# Patient Record
Sex: MALE | URBAN - METROPOLITAN AREA
[De-identification: names, ages, dates, MRNs, and addresses within clinical notes are randomized per-mention and may not be internally consistent; named-entity substitution may affect disease eponyms.]

---

## 2017-03-31 ENCOUNTER — INPATIENT (INPATIENT)
Facility: HOSPITAL | Age: 37
LOS: 0 days | Discharge: HOME | DRG: 395 | End: 2017-03-31
Attending: HOSPITALIST | Admitting: HOSPITALIST
Payer: COMMERCIAL

## 2017-03-31 VITALS
TEMPERATURE: 98 F | SYSTOLIC BLOOD PRESSURE: 134 MMHG | HEART RATE: 78 BPM | RESPIRATION RATE: 18 BRPM | DIASTOLIC BLOOD PRESSURE: 85 MMHG | OXYGEN SATURATION: 97 %

## 2017-03-31 VITALS
SYSTOLIC BLOOD PRESSURE: 144 MMHG | DIASTOLIC BLOOD PRESSURE: 78 MMHG | HEART RATE: 76 BPM | RESPIRATION RATE: 20 BRPM | TEMPERATURE: 98 F | OXYGEN SATURATION: 98 %

## 2017-03-31 DIAGNOSIS — T18.128A FOOD IN ESOPHAGUS CAUSING OTHER INJURY, INITIAL ENCOUNTER: ICD-10-CM

## 2017-03-31 DIAGNOSIS — Z98.890 OTHER SPECIFIED POSTPROCEDURAL STATES: Chronic | ICD-10-CM

## 2017-03-31 DIAGNOSIS — Z29.9 ENCOUNTER FOR PROPHYLACTIC MEASURES, UNSPECIFIED: ICD-10-CM

## 2017-03-31 DIAGNOSIS — T18.108A UNSPECIFIED FOREIGN BODY IN ESOPHAGUS CAUSING OTHER INJURY, INITIAL ENCOUNTER: ICD-10-CM

## 2017-03-31 LAB
ALBUMIN SERPL ELPH-MCNC: 4.8 G/DL — SIGNIFICANT CHANGE UP (ref 3.3–5)
ALP SERPL-CCNC: 77 U/L — SIGNIFICANT CHANGE UP (ref 40–120)
ALT FLD-CCNC: 27 U/L RC — SIGNIFICANT CHANGE UP (ref 10–45)
ANION GAP SERPL CALC-SCNC: 14 MMOL/L — SIGNIFICANT CHANGE UP (ref 5–17)
APTT BLD: 36.2 SEC — SIGNIFICANT CHANGE UP (ref 27.5–37.4)
AST SERPL-CCNC: 23 U/L — SIGNIFICANT CHANGE UP (ref 10–40)
BILIRUB SERPL-MCNC: 1.4 MG/DL — HIGH (ref 0.2–1.2)
BUN SERPL-MCNC: 22 MG/DL — SIGNIFICANT CHANGE UP (ref 7–23)
CALCIUM SERPL-MCNC: 9.5 MG/DL — SIGNIFICANT CHANGE UP (ref 8.4–10.5)
CHLORIDE SERPL-SCNC: 103 MMOL/L — SIGNIFICANT CHANGE UP (ref 96–108)
CO2 SERPL-SCNC: 25 MMOL/L — SIGNIFICANT CHANGE UP (ref 22–31)
CREAT SERPL-MCNC: 1 MG/DL — SIGNIFICANT CHANGE UP (ref 0.5–1.3)
GLUCOSE SERPL-MCNC: 96 MG/DL — SIGNIFICANT CHANGE UP (ref 70–99)
HCT VFR BLD CALC: 49.6 % — SIGNIFICANT CHANGE UP (ref 39–50)
HGB BLD-MCNC: 16.7 G/DL — SIGNIFICANT CHANGE UP (ref 13–17)
INR BLD: 1.07 RATIO — SIGNIFICANT CHANGE UP (ref 0.88–1.16)
MCHC RBC-ENTMCNC: 28.5 PG — SIGNIFICANT CHANGE UP (ref 27–34)
MCHC RBC-ENTMCNC: 33.7 GM/DL — SIGNIFICANT CHANGE UP (ref 32–36)
MCV RBC AUTO: 84.7 FL — SIGNIFICANT CHANGE UP (ref 80–100)
PLATELET # BLD AUTO: 223 K/UL — SIGNIFICANT CHANGE UP (ref 150–400)
POTASSIUM SERPL-MCNC: 4.4 MMOL/L — SIGNIFICANT CHANGE UP (ref 3.5–5.3)
POTASSIUM SERPL-SCNC: 4.4 MMOL/L — SIGNIFICANT CHANGE UP (ref 3.5–5.3)
PROT SERPL-MCNC: 7.6 G/DL — SIGNIFICANT CHANGE UP (ref 6–8.3)
PROTHROM AB SERPL-ACNC: 11.6 SEC — SIGNIFICANT CHANGE UP (ref 9.8–12.7)
RBC # BLD: 5.86 M/UL — HIGH (ref 4.2–5.8)
RBC # FLD: 12.7 % — SIGNIFICANT CHANGE UP (ref 10.3–14.5)
SODIUM SERPL-SCNC: 142 MMOL/L — SIGNIFICANT CHANGE UP (ref 135–145)
WBC # BLD: 10.1 K/UL — SIGNIFICANT CHANGE UP (ref 3.8–10.5)
WBC # FLD AUTO: 10.1 K/UL — SIGNIFICANT CHANGE UP (ref 3.8–10.5)

## 2017-03-31 PROCEDURE — 99254 IP/OBS CNSLTJ NEW/EST MOD 60: CPT | Mod: 25,GC

## 2017-03-31 PROCEDURE — 99285 EMERGENCY DEPT VISIT HI MDM: CPT | Mod: 25

## 2017-03-31 PROCEDURE — 43247 EGD REMOVE FOREIGN BODY: CPT | Mod: GC

## 2017-03-31 PROCEDURE — 80053 COMPREHEN METABOLIC PANEL: CPT

## 2017-03-31 PROCEDURE — 71020: CPT | Mod: 26

## 2017-03-31 PROCEDURE — 99222 1ST HOSP IP/OBS MODERATE 55: CPT

## 2017-03-31 PROCEDURE — 85027 COMPLETE CBC AUTOMATED: CPT

## 2017-03-31 PROCEDURE — 71046 X-RAY EXAM CHEST 2 VIEWS: CPT

## 2017-03-31 PROCEDURE — 99285 EMERGENCY DEPT VISIT HI MDM: CPT

## 2017-03-31 PROCEDURE — 85730 THROMBOPLASTIN TIME PARTIAL: CPT

## 2017-03-31 PROCEDURE — 85610 PROTHROMBIN TIME: CPT

## 2017-03-31 RX ORDER — ONDANSETRON 8 MG/1
4 TABLET, FILM COATED ORAL EVERY 6 HOURS
Qty: 0 | Refills: 0 | Status: DISCONTINUED | OUTPATIENT
Start: 2017-03-31 | End: 2017-03-31

## 2017-03-31 RX ORDER — PANTOPRAZOLE SODIUM 20 MG/1
40 TABLET, DELAYED RELEASE ORAL
Qty: 0 | Refills: 0 | Status: DISCONTINUED | OUTPATIENT
Start: 2017-03-31 | End: 2017-03-31

## 2017-03-31 RX ORDER — PANTOPRAZOLE SODIUM 20 MG/1
40 TABLET, DELAYED RELEASE ORAL ONCE
Qty: 0 | Refills: 0 | Status: DISCONTINUED | OUTPATIENT
Start: 2017-03-31 | End: 2017-03-31

## 2017-03-31 RX ORDER — SODIUM CHLORIDE 9 MG/ML
1000 INJECTION, SOLUTION INTRAVENOUS
Qty: 0 | Refills: 0 | Status: DISCONTINUED | OUTPATIENT
Start: 2017-03-31 | End: 2017-03-31

## 2017-03-31 RX ORDER — PANTOPRAZOLE SODIUM 20 MG/1
1 TABLET, DELAYED RELEASE ORAL
Qty: 30 | Refills: 0 | OUTPATIENT
Start: 2017-03-31 | End: 2017-04-30

## 2017-03-31 RX ADMIN — SODIUM CHLORIDE 70 MILLILITER(S): 9 INJECTION, SOLUTION INTRAVENOUS at 15:16

## 2017-03-31 NOTE — ED PROVIDER NOTE - NOTES
GI saw and dora in ED admit for EGD, case d/w Dr. Ludwig (PMD) admit to hospitalist. Dr. Story aware

## 2017-03-31 NOTE — ED PROVIDER NOTE - MEDICAL DECISION MAKING DETAILS
38 yo male presenting with foreign body sensation in chest; will consult GI xray ---> re eval likely endoscopy 36 yo male presenting with foreign body sensation in chest; will consult GI xray ---> re eval likely endoscopy    Attending MD Sauceda: 36yo male with PMH for esophageal food impaction 2015, presents with piece of corned beef stuck in esophagus since 6p last night.  Seen by GI in ED in ED and will take to endoscopy today.  Voice hoarse otherwise normal physical exam.

## 2017-03-31 NOTE — DISCHARGE NOTE ADULT - CARE PLAN
Principal Discharge DX:	Food impaction of esophagus  Goal:	Normal diet  Instructions for follow-up, activity and diet:	Follow up with GI Clinic for pathology results. Protonix 40 mg daily.  Secondary Diagnosis:	Eosinophilic esophagitis  Instructions for follow-up, activity and diet:	Possible eosinophilic esophagitis. Follow up with Dr. Diogenes Vega in GI clinic for pathology results and use Protonix daily

## 2017-03-31 NOTE — ED PROVIDER NOTE - OBJECTIVE STATEMENT
36 yo male presenting with foreign body sensation in his mid chest.  patient was eating corn beef at 6 pm last night and has had this feeling since then. has tried multiple attempts at vomiting but only a few pieces have came up.  has not taken anything for his symptoms.  foreign body feeling is not improved or worsened with anything.  happened last year and patient was admitted and intubated to dislodge the piece.  denies fevers, chills, sob, diarrhea.

## 2017-03-31 NOTE — H&P ADULT. - HISTORY OF PRESENT ILLNESS
36 y/o male with PMH of food impaction in 2015 presented with foreign body sensation in his mid chest and multiple episodes of NBNB vomiting after having corn beef at 6 pm last night and gets worse with eating or drinking. He vomited multiple times but only a few pieces have came up. He reports change in his voice after vomiting but denies any palpitation, SOB, LOC, rash, diarrhea, fever and chills.

## 2017-03-31 NOTE — DISCHARGE NOTE ADULT - CARE PROVIDER_API CALL
Harmony Ludwig), Medicine  [address] [place] [address]  Phone: [phone]  Fax: [phone]    Diogenes Vega), Gastroenterology; Internal Medicine  [address] [place] [address]  Phone: [phone]  Fax: [phone]
Harmony Ludwig), Medicine  [address] [place] [address]  Phone: [phone]  Fax: [phone]    Diogenes Vega), Gastroenterology; Internal Medicine  [address] [place] [address]  Phone: [phone]  Fax: [phone]

## 2017-03-31 NOTE — DISCHARGE NOTE ADULT - MEDICATION SUMMARY - MEDICATIONS TO CHANGE
I will SWITCH the dose or number of times a day I take the medications listed below when I get home from the hospital:  None
I will SWITCH the dose or number of times a day I take the medications listed below when I get home from the hospital:  None

## 2017-03-31 NOTE — DISCHARGE NOTE ADULT - CARE PLAN
Principal Discharge DX:	Food impaction of esophagus  Goal:	Normal food digestion  Instructions for follow-up, activity and diet:	Follow up with GI clinic and use Protonix 40 mg daily  Secondary Diagnosis:	Eosinophilic esophagitis  Instructions for follow-up, activity and diet:	Follow up with GI clinic and use Protonix 40 mg daily

## 2017-03-31 NOTE — DISCHARGE NOTE ADULT - MEDICATION SUMMARY - MEDICATIONS TO STOP TAKING
I will STOP taking the medications listed below when I get home from the hospital:  None
I will STOP taking the medications listed below when I get home from the hospital:  None

## 2017-03-31 NOTE — H&P ADULT. - ASSESSMENT
36 y/o male with PMH of food impaction in 2015 presented with foreign body sensation in his mid chest and multiple episodes of NBNB vomiting after having corn beef at 6 pm last night and gets worse with eating or drinking.

## 2017-03-31 NOTE — DISCHARGE NOTE ADULT - CARE PROVIDERS DIRECT ADDRESSES
,DirectAddress_Unknown,[email or web address],DirectAddress_Unknown
,DirectAddress_Unknown,[email or web address],DirectAddress_Unknown

## 2017-03-31 NOTE — ED PROVIDER NOTE - ATTENDING CONTRIBUTION TO CARE
Attending MD Sauceda:  I personally have seen and examined this patient.  Resident note reviewed and agree on plan of care and except where noted.  See MDM for details.

## 2017-03-31 NOTE — DISCHARGE NOTE ADULT - HOSPITAL COURSE
36 y/o male with PMH of food impaction in 2015 presented with foreign body sensation in his mid chest and multiple episodes of NBNB vomiting after having corn beef at 6 pm last night which got worse with eating or drinking. He had EGD done which showed food in the lower third of the esophagus. Removal of food was accomplished. Localized erythema was found in the lower third of the esophagus. Biopsies were taken the distal and mid-esophagus to evaluate for eosinophilic esophagitis. One 5 mm sessile polyp was found on the lesser curvature of the stomach. The polyp was removed with a jumbo cold forceps. Resection and retrieval were complete. Diet has been advanced and as per GI recommendation, patient will be discharged home with follow up with Dr. Diogenes Vega in GI clinic for pathology results and use Protonix daily.

## 2017-03-31 NOTE — H&P ADULT. - RS GEN PE MLT RESP DETAILS PC
clear to auscultation bilaterally/no rhonchi/no wheezes/airway patent/no subcutaneous emphysema/no rales/breath sounds equal

## 2017-03-31 NOTE — DISCHARGE NOTE ADULT - MEDICATION SUMMARY - MEDICATIONS TO TAKE
I will START or STAY ON the medications listed below when I get home from the hospital:    Protonix 40 mg oral granule, enteric coated  -- 1 tab(s) by mouth once a day before breakfast  -- It is very important that you take or use this exactly as directed.  Do not skip doses or discontinue unless directed by your doctor.  Obtain medical advice before taking any non-prescription drugs as some may affect the action of this medication.    -- Indication: For Eosinophilic esophagitis
I will START or STAY ON the medications listed below when I get home from the hospital:    Protonix 40 mg oral granule, enteric coated  -- 1 tab(s) by mouth once a day before breakfast  -- It is very important that you take or use this exactly as directed.  Do not skip doses or discontinue unless directed by your doctor.  Obtain medical advice before taking any non-prescription drugs as some may affect the action of this medication.    -- Indication: For Eosinophilic esophagitis

## 2017-03-31 NOTE — DISCHARGE NOTE ADULT - NURSING SECTION COMPLETE
Patient/Caregiver provided printed discharge information.
Patient/Caregiver provided printed discharge information.

## 2017-03-31 NOTE — DISCHARGE NOTE ADULT - PLAN OF CARE
Normal diet Follow up with GI Clinic for pathology results. Protonix 40 mg daily. Possible eosinophilic esophagitis. Follow up with Dr. Diogenes Vega in GI clinic for pathology results and use Protonix daily

## 2017-03-31 NOTE — H&P ADULT. - NEUROLOGICAL DETAILS
sensation intact/cranial nerves intact/normal strength/no spontaneous movement/alert and oriented x 3/responds to verbal commands/superficial reflexes intact

## 2017-03-31 NOTE — ED ADULT NURSE NOTE - OBJECTIVE STATEMENT
37y male presents to ED (ambulatory) complaining of "corn-beef stuck in throat" patient states this occurred last night 3/30/17. Patient can breath and speak, voice is hoarse, O2 98% room air. Patient has been vomiting since last night and states that he continues to vomit every time he drinks water or swallows something. He is producing more saliva than usual. He had a similar event last year and stated that he was previously intubated for this.

## 2017-03-31 NOTE — DISCHARGE NOTE ADULT - PATIENT PORTAL LINK FT
“You can access the FollowHealth Patient Portal, offered by [facility], by registering with the following website: [email or web address]”

## 2017-03-31 NOTE — H&P ADULT. - PROBLEM SELECTOR PLAN 1
- NPO  - GI consulted   - Endoscopy today   - Zofran IV PRN   - IV fluid   - EKG - NPO  - GI consulted   - Endoscopy today   - Zofran IV PRN   - IV fluid   - EKG  - Called PCP waiting for call back - NPO  - D/W GI  - Endoscopy today   - Zofran IV PRN   - IV fluid   - EKG  - Called PCP waiting for call back

## 2017-03-31 NOTE — DISCHARGE NOTE ADULT - HOSPITAL COURSE
36 y/o male with PMH of food impaction in 2015 presented with foreign body sensation in his mid chest and multiple episodes of NBNB vomiting after having corn beef at 6 pm last night and gets worse with eating or drinking. He had EGD done which showed food in the lower third of the esophagus. Removal of food was accomplished. Localized erythema was found in the lower third of the esophagus. Biopsies were taken the distal and mid-esophagus to evaluate for eosinophilic esophagitis. One 5 mm sessile polyp was found on the lesser curvature of the stomach. The polyp was removed with a jumbo cold forceps. Resection and retrieval were complete. Follow up with Dr. Diogenes Vega in GI clinic for pathology results and use Protonix daily. 38 y/o male with PMH of food impaction in 2015 presented with foreign body sensation in his mid chest and multiple episodes of NBNB vomiting after having corn beef at 6 pm last night which got worse with eating or drinking. He had EGD done which showed food in the lower third of the esophagus. Removal of food was accomplished. Localized erythema was found in the lower third of the esophagus. Biopsies were taken the distal and mid-esophagus to evaluate for eosinophilic esophagitis. One 5 mm sessile polyp was found on the lesser curvature of the stomach. The polyp was removed with a jumbo cold forceps. Resection and retrieval were complete. Diet has been advanced and as per GI recommendation, patient will be discharged home with follow up with Dr. Diogenes Vega in GI clinic for pathology results and use Protonix daily.

## 2017-03-31 NOTE — ED PROVIDER NOTE - CONSTITUTIONAL, MLM
normal... uncomfortable, well nourished, awake, alert, oriented to person, place, time/situation and in moderate distress.

## 2017-03-31 NOTE — DISCHARGE NOTE ADULT - PATIENT PORTAL LINK FT
“You can access the Follow[facility] Patient Portal, offered by [facility], by registering with the following website: [email or web address]”

## 2017-04-04 LAB — SURGICAL PATHOLOGY STUDY: SIGNIFICANT CHANGE UP

## 2017-04-20 PROBLEM — Z00.00 ENCOUNTER FOR PREVENTIVE HEALTH EXAMINATION: Noted: 2017-04-20

## 2017-05-25 ENCOUNTER — APPOINTMENT (OUTPATIENT)
Dept: GASTROENTEROLOGY | Facility: CLINIC | Age: 37
End: 2017-05-25

## 2017-05-25 VITALS
BODY MASS INDEX: 30.34 KG/M2 | DIASTOLIC BLOOD PRESSURE: 80 MMHG | HEIGHT: 72 IN | OXYGEN SATURATION: 98 % | SYSTOLIC BLOOD PRESSURE: 120 MMHG | WEIGHT: 224 LBS | TEMPERATURE: 98.2 F | HEART RATE: 87 BPM

## 2017-05-25 DIAGNOSIS — K21.9 GASTRO-ESOPHAGEAL REFLUX DISEASE W/OUT ESOPHAGITIS: ICD-10-CM

## 2017-05-25 DIAGNOSIS — T18.128A FOOD IN ESOPHAGUS CAUSING OTHER INJURY, INITIAL ENCOUNTER: ICD-10-CM

## 2017-05-25 RX ORDER — OMEPRAZOLE 20 MG/1
20 CAPSULE, DELAYED RELEASE ORAL
Qty: 90 | Refills: 3 | Status: ACTIVE | COMMUNITY
Start: 2017-05-25 | End: 1900-01-01

## 2022-02-14 NOTE — H&P ADULT. - SKIN/BREAST
Pt was calling to see if its okay to hold Eliquis for shoulder surgery. Surgery is not scheduled yet, waiting to see if its okay to hold Eliquis for 2 days before surgery with Dr. Knutson. . Please advise.    details…

## 2022-03-08 NOTE — H&P ADULT. - MOUTH
Physical Therapy     Referred by: Malik Soto MD; Medical Diagnosis (from order):    Diagnosis Information      Diagnosis    716.81 (ICD-9-CM) - M12.811, M12.812 (ICD-10-CM) - Rotator cuff arthropathy of both shoulders    716.81 (ICD-9-CM) - M75.100, M12.819 (ICD-10-CM) - Rotator cuff tear arthropathy, unspecified laterality                Initial Evaluation    Visit:  1   Treatment Diagnosis: left: shoulder, right: shoulder symptoms with impaired range of motion, impaired strength, increased pain/symptoms  Date of onset: 3/8/2019  Chart reviewed at time of initial evaluation (relevant co-morbidities, allergies, tests and medications listed): Past Medical History:  No date: Diverticulosis  No date: Jaw fracture (CMS/[facility])  No date: Nephrolithiasis  No date: Rotator cuff tear      Comment:  right  No date: Silicosis (CMS/[facility])      Comment:  previous work in a [employer]       Past Surgical History:  1975: Bronchoscopy  03/2014: Colonoscopy  2008: Colonoscopy  2003: Colonoscopy  02/14/2019: Colonoscopy  04/2019: Cystoscopy w/ ureteral stent removal  2019: Cystoscopy,ureteroscopy,lithotripsy  07/16/2020: Cystourethroscopy, with ureteroscopy and/or pyeloscopy  ;   Right  No date: Fracture surgery      Comment:  Jaw repair  Diagnostic Tests: X-ray: reviewed.  MRI studies: reviewed.     SUBJECTIVE                                                                                                               Patient presents with chronic, bilateral shoulder pain with notable history of R RTC tear and L biceps tear first noted approximately 3 years ago. Patient notes he is able to use his UEs normally but has a lot of pain. Was seen by ortho who recommended PT first, then injections with surgery as the final course of action should things not improve. Has had PT in the past which helped  Pain / Symptoms:  Pain rating (out of 10): Current: 5   Location: Bilateral shoulders  Quality / Description: ache, sharp, stiff,  throbbing.  Alleviating Factors: avoiding movement in involved area.   Progression since onset: no change  Function:   Limitations / Exacerbation Factors: difficulty, increased time, pain, upper body dressing, house/yard work, reaching and pushing/pulling, Toileting, overhead activities,   Prior Level of Function: declining function, therefore referred to therapy,  Patient Goals: decreased pain and increased strength    Prior treatment: outpatient PT  Discharged from hospital, home health, or skilled nursing facility in last 30 days: no    Home Environment:   Patient lives with significant other  Type of home: multiple level home  Assistance available: consistent  Feels safe at home/work/school.  2 or more falls or an unexplained fall with injury in the last year:  No    OBJECTIVE                                                                                                                     Range of Motion (ROM)   (degrees unless noted; active unless noted; norms in ( ); negative=lacking to 0, positive=beyond 0)   Shoulder:     - Flexion (180):        • Left: 110  Passive: 140         • Right: 110  Passive: 140     - Scaption:         • Left:  110 Passive: 140        • Right:  110 Passive: 140    - Behind Back Internal Rotation:        • Left: TL junction        • Right: TL junction    - Behind the Head External Rotation:        • Left: WNL        • Right: WNL    Strength  (out of 5 unless noted, standard test position unless noted, lbs tested with hand held dynamometer)   Shoulder:     - Flexion:         • Left: 2+, pain         • Right: 2+, pain     - Abduction:        • Left: 2+, pain        • Right: 2+, pain    - Internal Rotation:          • Left (at 0°): 4+        • Right (at 0°): 4+    - External Rotation:         • Left (at 0°): 3, pain        • Right (at 0°) :3, pain       Palpation:   Comments / Details: Derik deformity L biceps. Pain with palpation of the L coracoid process, L anterior humeral  head.       Outcome Measures:   Quick Disabilities of the Arm, Shoulder and Hand: QuickDash Total Score: 47.73  (scored 0-100; a higher score indicates greater disability) see flowsheet for additional documentation    TREATMENT                                                                                                                initial evaluation completed    Therapeutic Exercise:  Education and instruction in the following:   Patient's diagnosis and the relationship to their symptoms.   PT evaluation findings, treatment options, and expected outcomes of physical therapy.  Education of relevant anatomy and possible effects on current symptoms.  Techniques to manage pain and prevent progression of symptoms.   Activity modification to prevent increase in symptoms.   Proper sleeping positions and use of pillow.   Importance of proper postural alignment in reducing stress/strain.     Patient trained in and performed exercises listed below in HEP. Cues included: Performing within relatively pain free ROM.         Skilled input: verbal instruction/cues and tactile instruction/cues    Writer verbally educated and received verbal consent for hand placement, positioning of patient, and techniques to be performed today from patient for clothing adjustments for techniques, therapist position for techniques and hand placement and palpation for techniques as described above and how they are pertinent to the patient's plan of care.    Home Exercise Program/Education Materials: Access Code: [email or web address]  URL: [email or web address]  Date: 03/08/2022  Prepared by: Feliz Martinez    Exercises  · Shoulder External Rotation with Anchored Resistance - 3 x daily - 7 x weekly - 3 sets - 10 reps  · Shoulder Internal Rotation with Resistance - 3 x daily - 7 x weekly - 3 sets - 10 reps  · Standing Shoulder Flexion with Resistance - 3 x daily - 7 x weekly - 3 sets - 10 reps              ASSESSMENT                                                                                                            77 year old male patient has reported functional limitations listed above impacted by signs and symptoms consistent with below   • Involved:       - left shoulder      - right shoulder   • Symptoms/impairments: impaired range of motion, impaired strength and increased pain/symptoms    Patient with confirmed R supraspinatus tear and L biceps tear with aparna deformity. Most notably patient has gross shoulder weakness bilaterally and pain with active ROM flexion, scaption, ER of the shoulder bilaterally.   Prognosis: patient will benefit from skilled therapy  Rehabilitative potential is: fair due to; positive factors: motivation level; negative factors: age, time since onset of symptoms, failed response to prior treatment, lack of change in symptoms since onset and pain level  Predicted patient presentation: Low (stable) - Patient comorbidities and complexities, as defined above, will have little effect on progress for prescribed plan of care.  Patient Education:   Who will be receiving education: patient  Are they ready to learn: yes  Preferred learning style: written, verbal and demonstration  Barriers to learning: no barriers apparent at this time  Results of above outlined education: Verbalizes understanding and Demonstrates understanding      PLAN                                                                                                                         The following skilled interventions to be implemented to achieve goals listed below:Activities of Daily Living/Self Care (82248)  Dry Needling  Gait Training (71010)  Manual Therapy (04224)  Neuromuscular Re-Education (11690)  Therapeutic Activity (66055)  Therapeutic Exercise (56328)  Electrical Stimulation (unattended, 79304 or )  Heat/Cold (23798)  Ultrasound/Phonophoresis (94632)  Frequency / Duration: 2 times per week tapering as patient progresses for  an estimated total of 20 visits for 10 weeks    Patient involved in and agreed to plan of care and goals.  Patient given attendance policy at time of initial evaluation.  Suggestions for next session as indicated: Progress per plan of care      GOALS                                                                                                                           Long Term Goals: to be met by end of plan of care  1. Patient will reach overhead with pain no greater than 2/10 in order to reach into cupboard and perform housework and yardwork.    2.  Patient will reach behind back and behind head with pain no greater than 2/10 in order to perform dressing and grooming.  3. Patient will lift  15 pounds in order to carry laundry and groceries.    4. Patient will be independent with progressed and modified home exercise program.  5. Patient will self report improvement in QDASH by 11 points (MDC) or greater in order to demonstrate clinically significant improvement in upper extremity function.         Therapy procedure time and total treatment time can be found documented on the Time Entry flowsheet   normal mouth and gums

## 2022-06-07 NOTE — H&P ADULT. - NEGATIVE HEMATOLOGY SYMPTOMS
Occupational Therapy  Visit Type: treatment    SUBJECTIVE  Patient agreed to participate in therapy this date.  \" I have knots in my hair\"    OBJECTIVE     Patient activity tolerance: 1 to 1 activity to rest    Activities of Daily Living (ADLs):  Activities of daily living training:   Completed prior to patient's session with nursing.  Focused on hair washing with shower cap and set up sitting at the chair. Hair noted to be quite matted this session. Provided comb to focuse on grooming with independence.             ASSESSMENT  Impairments: activity tolerance  Functional Limitations: bed mobility, functional mobility, grooming, bathing, toileting, functional transfers and dressing       Discharge Recommendations:  Recommendation for Discharge Location: OT WI: Post-acute facility with therapy needs (vs home with Home therapy and HH pending progress)  Recommendation for Discharge Support: OT WI: Intermittent assist daily  PT/OT Mobility Equipment for Discharge: none anticipated  PT/OT ADL Equipment for Discharge: continue to assess  OT Identified Barriers to Discharge: weakness    Pt continues to make slow and steady  progress towards all short term goals. No formal goals met this session. Today's session focused on ADL retraining with self grooming/hair washing.    Recommend continuation of skilled Occupational Therapy to increase strength, activity tolerance, safety awareness, and independence with activity of daily living  tasks. Pt planning discharge to skilled nursing facility post discharge from [facility] for further rehab. Discussed progression towards goals and recommendations with Supervising Registered Occupational Therapist. Handoff Communication provided to ISABEL Sheets.  Progress: improving as expected and progressing toward goals    • Skilled therapy is required to address these limitations in attempt to maximize the patient's independence.    Education Provided:   Learning Assessment:  - Primary learner:  patient  - Are they ready to learn: yes  - Preferred learning style: verbal and demonstration  - Barriers to learning: no barriers apparent at this time  Education provided during session:  - Receiving Education: patient  - ADL retraining, ease of process.  - Results of above outlined education: Verbalizes understanding, Demonstrates understanding and Needs reinforcement    Patient at End of Session:   Location: in chair  Safety measures: alarm system in place/re-engaged and call light within reach  Handoff to: nurse    PLAN  Suggestions for next session as indicated: OT Frequency: 5 days/week  Frequency Comments: no weekend      Next session:  Progress towards ADL.  Interventions: ADL retraining, activity tolerance training, balance, bed mobility training, therapeutic exercise, therapeutic activity, sensory reintegration, upper extremity strengthening/ROM, transfer training, safety training, HEP training, functional transfer training and patient/family training      GOALS  Review Date: 6/13/2022  Long Term Goals: (to be met by time of discharge from hospital)  Grooming: Patient will complete grooming tasks in sitting and at sink supervision.  Status: progressing/ongoing  Upper body dressing: Patient will complete upper body dressing in sitting set up.  Status: progressing/ongoing  Lower body dressing: Patient will complete lower body dressing in sitting set up and supervision.  Status: progressing/ongoing  Toileting: Patient will complete toileting supervision.  Status: progressing/ongoing  Bathing: Patient will complete bathing standing at sink and sitting at sinkset up and minimal assist   Status: progressing/ongoingHome program: patient independent with home program as instructed.   Status: progressing/ongoing (targeting scapular/shoulder strength)        Documented in the chart in the following areas: Pain.      Therapy procedure time and total treatment time can be found documented on the Time Entry  flowsheet   no nose bleeding/no gum bleeding

## 2022-06-23 NOTE — PATIENT PROFILE ADULT. - TEACHING/LEARNING FACTORS IMPACT ABILITY TO LEARN
Benzoyl Peroxide Pregnancy And Lactation Text: This medication is Pregnancy Category C. It is unknown if benzoyl peroxide is excreted in breast milk. none

## 2022-11-18 NOTE — ED ADULT TRIAGE NOTE - ESI TRIAGE ACUITY LEVEL, MLM
Operative Note    Preoperative diagnosis: Right ting trigger finger  Postoperative diagnosis: synovitis of flexor tendon ,right ring finger  Procedure: synovectomy of flexor tendon sheath, right ring finger   Surgeon: Adrián Suarez DO  Anesthesia: local  Complications: none  EBL: minimal  Assist: Miriam Camacho  Findings:synovitis FDS right ring finger     Indications:  the patient was seen in an outpatient setting, and found to have symptomatic trigger finger on the right ring finger.  The patient had failed conservative treatment and was requesting surgical intervention.  The risks and benefits to operative and nonoperative surgery were discussed, and the patient preferred to proceed with the procedure.      The patient was marked in the preoperative area.  The patient was then brought to the operating room.  Under sterile techniques, and equal mixture of 1% lidocaine with epinephrine mixed 1:100,000 was injected into the volar A1 pulley region of the right ring finger.       The right upper extremity was prepped and drapped in the usual manner.     A 1.5 cm incision was made over the right ring A1 pulley region. The radial and ulnar neurovascular structures were protected. The A1 pulley was visualized in its' entirety.  The A1 pulley was released under direction vision, with care taken to preserve the A2 pulley.  The underlying flexor tendon appeared thickened.  The synovial hypertrophy of the FDS appeared to impinge on the proximal edge of A2.  This synovium was sharply excised.  Following pulley release and synovectomy, the patient had the ability to flex and extend the digit without evidence of clicking, catching, triggering, or mechanical impingement.      The wound was thoroughly irrigated.  Hemostasis obtained with pressure.  The skin was closed with interuppted 4-0 nylon and a soft dressing applied.  The finger and thumb were pink, warm and well perfused following the surgery. No complications were noted.      3